# Patient Record
Sex: MALE | Race: WHITE | NOT HISPANIC OR LATINO | Employment: OTHER | URBAN - METROPOLITAN AREA
[De-identification: names, ages, dates, MRNs, and addresses within clinical notes are randomized per-mention and may not be internally consistent; named-entity substitution may affect disease eponyms.]

---

## 2024-05-24 ENCOUNTER — HOSPITAL ENCOUNTER (OUTPATIENT)
Facility: CLINIC | Age: 89
Setting detail: OBSERVATION
Discharge: HOME | End: 2024-05-25
Attending: EMERGENCY MEDICINE | Admitting: EMERGENCY MEDICINE
Payer: COMMERCIAL

## 2024-05-24 ENCOUNTER — APPOINTMENT (OUTPATIENT)
Dept: MRI IMAGING | Facility: CLINIC | Age: 89
End: 2024-05-24
Attending: EMERGENCY MEDICINE
Payer: COMMERCIAL

## 2024-05-24 DIAGNOSIS — R42 VERTIGO: ICD-10-CM

## 2024-05-24 DIAGNOSIS — N18.9 ACUTE KIDNEY INJURY SUPERIMPOSED ON CKD (H): ICD-10-CM

## 2024-05-24 DIAGNOSIS — N17.9 ACUTE KIDNEY INJURY SUPERIMPOSED ON CKD (H): ICD-10-CM

## 2024-05-24 PROBLEM — N18.4 TYPE 2 DIABETES MELLITUS WITH STAGE 4 CHRONIC KIDNEY DISEASE, WITHOUT LONG-TERM CURRENT USE OF INSULIN (H): Status: ACTIVE | Noted: 2023-12-03

## 2024-05-24 PROBLEM — N40.1 BENIGN PROSTATIC HYPERPLASIA WITH URINARY OBSTRUCTION: Status: ACTIVE | Noted: 2024-05-24

## 2024-05-24 PROBLEM — E11.22 TYPE 2 DIABETES MELLITUS WITH STAGE 4 CHRONIC KIDNEY DISEASE, WITHOUT LONG-TERM CURRENT USE OF INSULIN (H): Status: ACTIVE | Noted: 2023-12-03

## 2024-05-24 PROBLEM — K21.9 GERD (GASTROESOPHAGEAL REFLUX DISEASE): Status: ACTIVE | Noted: 2019-01-03

## 2024-05-24 PROBLEM — N18.4 STAGE 4 CHRONIC KIDNEY DISEASE (H): Status: ACTIVE | Noted: 2019-04-03

## 2024-05-24 PROBLEM — N13.8 BENIGN PROSTATIC HYPERPLASIA WITH URINARY OBSTRUCTION: Status: ACTIVE | Noted: 2024-05-24

## 2024-05-24 PROBLEM — I48.0 PAROXYSMAL ATRIAL FIBRILLATION (H): Status: ACTIVE | Noted: 2022-12-26

## 2024-05-24 PROBLEM — N25.81 HYPERPARATHYROIDISM, SECONDARY RENAL (H): Status: ACTIVE | Noted: 2022-04-06

## 2024-05-24 PROBLEM — E78.5 DYSLIPIDEMIA: Status: ACTIVE | Noted: 2024-05-24

## 2024-05-24 PROBLEM — E03.9 ACQUIRED HYPOTHYROIDISM: Status: ACTIVE | Noted: 2019-01-03

## 2024-05-24 PROBLEM — D63.1 ANEMIA OF CHRONIC KIDNEY FAILURE: Status: ACTIVE | Noted: 2019-01-03

## 2024-05-24 PROBLEM — I77.82 ANCA-ASSOCIATED VASCULITIS (H): Status: ACTIVE | Noted: 2022-04-06

## 2024-05-24 PROBLEM — I25.10 CORONARY ARTERY DISEASE INVOLVING NATIVE CORONARY ARTERY OF NATIVE HEART: Status: ACTIVE | Noted: 2019-01-03

## 2024-05-24 PROBLEM — I49.5 TACHY-BRADY SYNDROME (H): Status: ACTIVE | Noted: 2023-02-28

## 2024-05-24 PROBLEM — D63.8 ANEMIA OF CHRONIC DISEASE: Status: ACTIVE | Noted: 2020-12-02

## 2024-05-24 LAB
ALBUMIN SERPL BCG-MCNC: 4 G/DL (ref 3.5–5.2)
ALP SERPL-CCNC: 98 U/L (ref 40–150)
ALT SERPL W P-5'-P-CCNC: 11 U/L (ref 0–70)
ANION GAP SERPL CALCULATED.3IONS-SCNC: 10 MMOL/L (ref 7–15)
ANION GAP SERPL CALCULATED.3IONS-SCNC: 11 MMOL/L (ref 7–15)
AST SERPL W P-5'-P-CCNC: 21 U/L (ref 0–45)
BASOPHILS # BLD AUTO: 0 10E3/UL (ref 0–0.2)
BASOPHILS NFR BLD AUTO: 0 %
BILIRUB SERPL-MCNC: 0.3 MG/DL
BUN SERPL-MCNC: 54.6 MG/DL (ref 8–23)
BUN SERPL-MCNC: 59.4 MG/DL (ref 8–23)
CALCIUM SERPL-MCNC: 8.9 MG/DL (ref 8.8–10.2)
CALCIUM SERPL-MCNC: 9.4 MG/DL (ref 8.8–10.2)
CHLORIDE SERPL-SCNC: 109 MMOL/L (ref 98–107)
CHLORIDE SERPL-SCNC: 109 MMOL/L (ref 98–107)
CREAT SERPL-MCNC: 2.87 MG/DL (ref 0.67–1.17)
CREAT SERPL-MCNC: 3.2 MG/DL (ref 0.67–1.17)
DEPRECATED HCO3 PLAS-SCNC: 21 MMOL/L (ref 22–29)
DEPRECATED HCO3 PLAS-SCNC: 21 MMOL/L (ref 22–29)
EGFRCR SERPLBLD CKD-EPI 2021: 18 ML/MIN/1.73M2
EGFRCR SERPLBLD CKD-EPI 2021: 20 ML/MIN/1.73M2
EOSINOPHIL # BLD AUTO: 0.1 10E3/UL (ref 0–0.7)
EOSINOPHIL NFR BLD AUTO: 1 %
ERYTHROCYTE [DISTWIDTH] IN BLOOD BY AUTOMATED COUNT: 15 % (ref 10–15)
GLUCOSE SERPL-MCNC: 115 MG/DL (ref 70–99)
GLUCOSE SERPL-MCNC: 140 MG/DL (ref 70–99)
HCT VFR BLD AUTO: 36.8 % (ref 40–53)
HGB BLD-MCNC: 11.4 G/DL (ref 13.3–17.7)
HOLD SPECIMEN: NORMAL
HOLD SPECIMEN: NORMAL
IMM GRANULOCYTES # BLD: 0 10E3/UL
IMM GRANULOCYTES NFR BLD: 0 %
LYMPHOCYTES # BLD AUTO: 1.2 10E3/UL (ref 0.8–5.3)
LYMPHOCYTES NFR BLD AUTO: 15 %
MCH RBC QN AUTO: 31.8 PG (ref 26.5–33)
MCHC RBC AUTO-ENTMCNC: 31 G/DL (ref 31.5–36.5)
MCV RBC AUTO: 103 FL (ref 78–100)
MONOCYTES # BLD AUTO: 0.5 10E3/UL (ref 0–1.3)
MONOCYTES NFR BLD AUTO: 6 %
NEUTROPHILS # BLD AUTO: 6.3 10E3/UL (ref 1.6–8.3)
NEUTROPHILS NFR BLD AUTO: 78 %
NRBC # BLD AUTO: 0 10E3/UL
NRBC BLD AUTO-RTO: 0 /100
PLATELET # BLD AUTO: 197 10E3/UL (ref 150–450)
POTASSIUM SERPL-SCNC: 5.4 MMOL/L (ref 3.4–5.3)
POTASSIUM SERPL-SCNC: 5.8 MMOL/L (ref 3.4–5.3)
PROT SERPL-MCNC: 7.4 G/DL (ref 6.4–8.3)
RBC # BLD AUTO: 3.58 10E6/UL (ref 4.4–5.9)
SODIUM SERPL-SCNC: 140 MMOL/L (ref 135–145)
SODIUM SERPL-SCNC: 141 MMOL/L (ref 135–145)
WBC # BLD AUTO: 8.2 10E3/UL (ref 4–11)

## 2024-05-24 PROCEDURE — 82310 ASSAY OF CALCIUM: CPT | Performed by: EMERGENCY MEDICINE

## 2024-05-24 PROCEDURE — 250N000011 HC RX IP 250 OP 636: Performed by: PHYSICIAN ASSISTANT

## 2024-05-24 PROCEDURE — 80053 COMPREHEN METABOLIC PANEL: CPT | Performed by: EMERGENCY MEDICINE

## 2024-05-24 PROCEDURE — 99285 EMERGENCY DEPT VISIT HI MDM: CPT | Mod: 25

## 2024-05-24 PROCEDURE — G0378 HOSPITAL OBSERVATION PER HR: HCPCS

## 2024-05-24 PROCEDURE — 258N000003 HC RX IP 258 OP 636: Performed by: EMERGENCY MEDICINE

## 2024-05-24 PROCEDURE — 36415 COLL VENOUS BLD VENIPUNCTURE: CPT | Performed by: EMERGENCY MEDICINE

## 2024-05-24 PROCEDURE — 99222 1ST HOSP IP/OBS MODERATE 55: CPT | Performed by: PHYSICIAN ASSISTANT

## 2024-05-24 PROCEDURE — 96361 HYDRATE IV INFUSION ADD-ON: CPT

## 2024-05-24 PROCEDURE — 70544 MR ANGIOGRAPHY HEAD W/O DYE: CPT | Mod: XU

## 2024-05-24 PROCEDURE — 93005 ELECTROCARDIOGRAM TRACING: CPT

## 2024-05-24 PROCEDURE — 250N000013 HC RX MED GY IP 250 OP 250 PS 637: Performed by: EMERGENCY MEDICINE

## 2024-05-24 PROCEDURE — 70547 MR ANGIOGRAPHY NECK W/O DYE: CPT

## 2024-05-24 PROCEDURE — 85025 COMPLETE CBC W/AUTO DIFF WBC: CPT | Performed by: EMERGENCY MEDICINE

## 2024-05-24 PROCEDURE — 70551 MRI BRAIN STEM W/O DYE: CPT

## 2024-05-24 PROCEDURE — 250N000009 HC RX 250: Performed by: PHYSICIAN ASSISTANT

## 2024-05-24 PROCEDURE — 258N000003 HC RX IP 258 OP 636: Performed by: PHYSICIAN ASSISTANT

## 2024-05-24 PROCEDURE — 96374 THER/PROPH/DIAG INJ IV PUSH: CPT

## 2024-05-24 PROCEDURE — 250N000013 HC RX MED GY IP 250 OP 250 PS 637: Performed by: PHYSICIAN ASSISTANT

## 2024-05-24 RX ORDER — ISOSORBIDE MONONITRATE 30 MG/1
30 TABLET, EXTENDED RELEASE ORAL 2 TIMES DAILY
Status: ON HOLD | COMMUNITY
End: 2024-05-24

## 2024-05-24 RX ORDER — ALLOPURINOL 100 MG/1
200 TABLET ORAL DAILY
COMMUNITY

## 2024-05-24 RX ORDER — HYDRALAZINE HYDROCHLORIDE 20 MG/ML
10 INJECTION INTRAMUSCULAR; INTRAVENOUS EVERY 6 HOURS PRN
Status: DISCONTINUED | OUTPATIENT
Start: 2024-05-24 | End: 2024-05-25 | Stop reason: HOSPADM

## 2024-05-24 RX ORDER — MAGNESIUM OXIDE 400 MG/1
800 TABLET ORAL 2 TIMES DAILY
COMMUNITY

## 2024-05-24 RX ORDER — ACETAMINOPHEN 650 MG/1
650 SUPPOSITORY RECTAL EVERY 4 HOURS PRN
Status: DISCONTINUED | OUTPATIENT
Start: 2024-05-24 | End: 2024-05-25 | Stop reason: HOSPADM

## 2024-05-24 RX ORDER — ISOSORBIDE MONONITRATE 30 MG/1
30 TABLET, EXTENDED RELEASE ORAL DAILY
COMMUNITY

## 2024-05-24 RX ORDER — AMOXICILLIN 250 MG
2 CAPSULE ORAL 2 TIMES DAILY PRN
Status: DISCONTINUED | OUTPATIENT
Start: 2024-05-24 | End: 2024-05-25 | Stop reason: HOSPADM

## 2024-05-24 RX ORDER — VITAMIN B COMPLEX
25 TABLET ORAL DAILY
COMMUNITY

## 2024-05-24 RX ORDER — LOSARTAN POTASSIUM 50 MG/1
50 TABLET ORAL DAILY
Status: DISCONTINUED | OUTPATIENT
Start: 2024-05-24 | End: 2024-05-24

## 2024-05-24 RX ORDER — LANOLIN ALCOHOL/MO/W.PET/CERES
500 CREAM (GRAM) TOPICAL DAILY
COMMUNITY

## 2024-05-24 RX ORDER — ACETAMINOPHEN 325 MG/1
650 TABLET ORAL EVERY 4 HOURS PRN
Status: DISCONTINUED | OUTPATIENT
Start: 2024-05-24 | End: 2024-05-25 | Stop reason: HOSPADM

## 2024-05-24 RX ORDER — ATORVASTATIN CALCIUM 20 MG/1
20 TABLET, FILM COATED ORAL DAILY
COMMUNITY

## 2024-05-24 RX ORDER — ONDANSETRON 4 MG/1
4 TABLET, ORALLY DISINTEGRATING ORAL EVERY 6 HOURS PRN
Status: DISCONTINUED | OUTPATIENT
Start: 2024-05-24 | End: 2024-05-25 | Stop reason: HOSPADM

## 2024-05-24 RX ORDER — NIFEDIPINE 30 MG/1
30 TABLET, EXTENDED RELEASE ORAL DAILY
Status: DISCONTINUED | OUTPATIENT
Start: 2024-05-25 | End: 2024-05-24

## 2024-05-24 RX ORDER — NIFEDIPINE 30 MG/1
30 TABLET, EXTENDED RELEASE ORAL DAILY
Status: DISCONTINUED | OUTPATIENT
Start: 2024-05-24 | End: 2024-05-24

## 2024-05-24 RX ORDER — DONEPEZIL HYDROCHLORIDE 10 MG/1
10 TABLET, FILM COATED ORAL DAILY
COMMUNITY

## 2024-05-24 RX ORDER — ONDANSETRON 2 MG/ML
4 INJECTION INTRAMUSCULAR; INTRAVENOUS EVERY 6 HOURS PRN
Status: DISCONTINUED | OUTPATIENT
Start: 2024-05-24 | End: 2024-05-25 | Stop reason: HOSPADM

## 2024-05-24 RX ORDER — MECLIZINE HYDROCHLORIDE 25 MG/1
25 TABLET ORAL ONCE
Status: COMPLETED | OUTPATIENT
Start: 2024-05-24 | End: 2024-05-24

## 2024-05-24 RX ORDER — LEVOTHYROXINE SODIUM 25 UG/1
25 TABLET ORAL DAILY
COMMUNITY

## 2024-05-24 RX ORDER — HYDRALAZINE HYDROCHLORIDE 20 MG/ML
10 INJECTION INTRAMUSCULAR; INTRAVENOUS EVERY 6 HOURS PRN
Status: DISCONTINUED | OUTPATIENT
Start: 2024-05-24 | End: 2024-05-24

## 2024-05-24 RX ORDER — LOSARTAN POTASSIUM 50 MG/1
50 TABLET ORAL AT BEDTIME
COMMUNITY

## 2024-05-24 RX ORDER — METOPROLOL SUCCINATE 50 MG/1
50 TABLET, EXTENDED RELEASE ORAL 2 TIMES DAILY
Status: DISCONTINUED | OUTPATIENT
Start: 2024-05-24 | End: 2024-05-25 | Stop reason: HOSPADM

## 2024-05-24 RX ORDER — NIFEDIPINE 30 MG/1
30 TABLET, EXTENDED RELEASE ORAL DAILY
Status: DISCONTINUED | OUTPATIENT
Start: 2024-05-25 | End: 2024-05-25 | Stop reason: HOSPADM

## 2024-05-24 RX ORDER — METOPROLOL SUCCINATE 50 MG/1
50 TABLET, EXTENDED RELEASE ORAL 2 TIMES DAILY
COMMUNITY

## 2024-05-24 RX ORDER — AMOXICILLIN 250 MG
1 CAPSULE ORAL 2 TIMES DAILY PRN
Status: DISCONTINUED | OUTPATIENT
Start: 2024-05-24 | End: 2024-05-25 | Stop reason: HOSPADM

## 2024-05-24 RX ADMIN — APIXABAN 2.5 MG: 2.5 TABLET, FILM COATED ORAL at 23:54

## 2024-05-24 RX ADMIN — HYDRALAZINE HYDROCHLORIDE 10 MG: 20 INJECTION INTRAMUSCULAR; INTRAVENOUS at 20:50

## 2024-05-24 RX ADMIN — METOPROLOL SUCCINATE 50 MG: 50 TABLET, EXTENDED RELEASE ORAL at 23:54

## 2024-05-24 RX ADMIN — MECLIZINE HYDROCHLORIDE 25 MG: 25 TABLET ORAL at 16:06

## 2024-05-24 RX ADMIN — SODIUM ZIRCONIUM CYCLOSILICATE 10 G: 5 POWDER, FOR SUSPENSION ORAL at 22:37

## 2024-05-24 RX ADMIN — SODIUM BICARBONATE: 84 INJECTION, SOLUTION INTRAVENOUS at 23:53

## 2024-05-24 RX ADMIN — SODIUM CHLORIDE 1000 ML: 9 INJECTION, SOLUTION INTRAVENOUS at 13:45

## 2024-05-24 ASSESSMENT — ACTIVITIES OF DAILY LIVING (ADL)
ADLS_ACUITY_SCORE: 35

## 2024-05-24 ASSESSMENT — COLUMBIA-SUICIDE SEVERITY RATING SCALE - C-SSRS
6. HAVE YOU EVER DONE ANYTHING, STARTED TO DO ANYTHING, OR PREPARED TO DO ANYTHING TO END YOUR LIFE?: NO
1. IN THE PAST MONTH, HAVE YOU WISHED YOU WERE DEAD OR WISHED YOU COULD GO TO SLEEP AND NOT WAKE UP?: NO
2. HAVE YOU ACTUALLY HAD ANY THOUGHTS OF KILLING YOURSELF IN THE PAST MONTH?: NO

## 2024-05-24 NOTE — H&P
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     Dizziness, likely multifactorial in the setting of accelerated hypertension and hypovolemia   Patient presents from adult  due to acute onset dizziness while playing dominos. Describes as feeling light headed and off balance with subsequent episode of vomiting. BP at the time was 200 systolic per report. Denies associated cardiac symptoms   *MRI/MRA in the ED shows chronic small vessel ischemic disease with unremarkable angio  *EKG with sinus bradycardia in 50s, incomplete RBBB (known)  *workup also notable for ALYSSIA  *s/p IVF bolus and meclizine in the ED   Feeling significantly improved at time of admission   --admit to observation  --telemetry, daughter reports baseline HR 40-50s  --check orthostatics   --management of BP as below   --s/p 1L bolus, continue maintenance fluid overnight   --PT  --ECHO    Accelerated hypertension  BP has been generally around 140-150 at home per daughter.   BP taken by nursing at adult  reportedly in 200s.   BP on arrival to ED elevated 180-190s  PTA he follows with Nephrology, awaiting med rec but daughter reports he takes metoprolol, Imdur, and losartan. 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   Baseline Cr 2-2.9 recently  Cr on admission 3.2,  improved to 2.87 after IVF   K on admission mildly elevated 5.4>5.8 after IVF  Daughter reports water intake is minimal at home despite encouragement  --lokelma x2  --start sodium bicarb x12 hours  --recheck K @ midnight, please page provider if abnormal   --avoid nephrotoxins   --renal diet   --hold losartan     Paroxysmal atrial fibrillation   Sinus bradycardia   PTA on metoprolol and Eliquis per daughter, awaiting formal med rec   In sinus rhythm on admission  Per most recent Cardiology notes baseline HR 40-50s and he has been asymptomatic with this so BB continued at present dose.   --telemetry  --resume metoprolol and Eliquis when verified     Cognitive impairment  Resume Aricept when verified.     HLD  Resume statin at discharge     Hx gout  Resume allopurinol when verified     Hypothyroidism  Resume PTA levothyroxine when verified         Diet: Regular Diet Adult    DVT Prophylaxis: DOAC  Urrutia Catheter: Not present  Lines: None     Cardiac Monitoring: None  Code Status:  FULL CODE, discussed on admission with pt and daughter     Clinically Significant Risk Factors Present on Admission        # Hyperkalemia: Highest K = 5.4 mmol/L in last 2 days, will monitor as appropriate         # Drug Induced Platelet Defect: home medication list includes an antiplatelet medication                   Disposition Plan     Medically Ready for Discharge: 1-2 days        The patient's care was discussed with Dr. Siddiqui who agrees with the above plan     Fay Mccloud PA-C  Hospitalist Service  [facility]  Securely message with [employer] (more info)  Text page via [employer]/Directory     ______________________________________________________________________    Chief Complaint   dizziness    History is obtained from the patient and his daughter who serves as      History of Present Illness   Jack Ryan is a 88 year old male with a past medical history significant for CKD in the  setting of ANCA vasculitis, HTN, BPH, paroxysmal atrial fibrillation, HLD, hypothyroidism, cognitive impairment, gout admitted on 5/24/2024 to observation after presenting with dizziness.   Patient's daughter is present at bedside and serving as .  She reports that the patient was in his usual state of health today and blood pressure was in the 140s when he took it this morning as he always does.  He was at adult  playing [employer] when he reported feeling dizzy to staff there.  He describes this as a gradual onset spinning sensation and says he felt unsteady like he needed help when walking around.  The dizziness caused him to feel nauseated and eventually vomit x 1.  He denied any associated chest pain palpitations or shortness of breath.  On arrival of EMS he was found to have a blood pressure of 199/74.  On arrival to the ED he reported a headache.  He was evaluated with MRI/MRA and labs showing ALYSSIA and hyperkalemia.  He reported feeling better shortly after arrival to the ED and tells me this occurred after receiving IV fluids.  His daughter reports fluid intake has been challenging for him and he drinks little water.  The patient reports he feels well at present.  Denies any chest or abdominal pain.  No shortness of breath.  Denies dizziness currently.  Reports he ambulated to the bathroom in the ED and felt back to baseline.  He took all of his normal medications this morning.    Past Medical History    Past Medical History:   Diagnosis Date    Glaucoma associated with anterior segment anomaly     Hypertension     Hypertrophy of prostate without urinary obstruction and other lower urinary tract symptoms (LUTS)     Other and unspecified hyperlipidemia     Type 2 diabetes mellitus with stage 4 chronic kidney disease, without long-term current use of insulin (H) 12/3/2023       Past Surgical History   Past Surgical History:   Procedure Laterality Date    GYN SURGERY      INCISION LIMBAL  RELAXING, KERATOTOMY ARCUATE Left 2014    Procedure: INCISION LIMBAL RELAXING, KERATOTOMY ARCUATE;  Surgeon: Josselyn Morales MD;  Location: [facility]    PHACOEMULSIFICATION CLEAR CORNEA W/ STANDARD IOL IMPLANT, ENDOSCOPIC CYCLOPHOTOCOAGULATION, COMBINED  6/10/2014    Procedure: COMBINED PHACOEMULSIFICATION CLEAR CORNEA WITH STANDARD INTRAOCULAR LENS IMPLANT, ENDOSCOPIC CYCLOPHOTOCOAGULATION;  Surgeon: Josselyn Morales MD;  Location:  [facility]    PHACOEMULSIFICATION CLEAR CORNEA W/ STANDARD IOL IMPLANT, ENDOSCOPIC CYCLOPHOTOCOAGULATION, COMBINED Left 2014    Procedure: COMBINED PHACOEMULSIFICATION CLEAR CORNEA WITH STANDARD INTRAOCULAR LENS IMPLANT, ENDOSCOPIC CYCLOPHOTOCOAGULATION;  Surgeon: Josselyn Morales MD;  Location: [facility]       Prior to Admission Medications   Prior to Admission Medications   Prescriptions Last Dose Informant Patient Reported? Taking?   AMLODIPINE BESYLATE PO   Yes No   Sig: Take 2.5 mg by mouth daily   ATORVASTATIN CALCIUM PO   Yes No   Sig: Take 20 mg by mouth daily   DOCUSATE SODIUM PO   Yes No   Sig: Take 100 mg by mouth 2 times daily   IBUPROFEN PO   Yes No   Sig: Take by mouth every 6 hours as needed for moderate pain   Multiple Vitamins-Minerals (MULTIVITAMIN OR)   Yes No   Sig: Take 1 tablet by mouth daily   Omega-3 Fatty Acids (OMEGA-3 FISH OIL PO)   Yes No   acetaZOLAMIDE (DIAMOX SEQUELS) 500 MG capsule   No No   Sig: Take 1 capsule (500 mg) by mouth every 12 hours   aspirin 81 MG tablet   Yes No   Sig: Take 81 mg by mouth daily   bimatoprost (LUMIGAN) 0.03 % ophthalmic drops   Yes No   Si drop At Bedtime   dorzolamide-timolol (COSOPT) 2-0.5 % ophthalmic solution   Yes No   Si drop 2 times daily   fluticasone (FLONASE) 50 MCG/ACT nasal spray   Yes No   Sig: Spray 2 sprays into both nostrils daily   sodium chloride (OCEAN) 0.65 % nasal spray   Yes No   Sig: Spray 1 spray into both nostrils daily as needed for congestion      Facility-Administered Medications: None         Social History   I have reviewed this patient's social history and updated it with pertinent information if needed.  Social History     Tobacco Use    Smoking status: Never   Substance Use Topics    Alcohol use: No    Drug use: No        Physical Exam   Temp: 98  F (36.7  C) Temp src: Oral BP: (!) 149/64 Pulse: 59   Resp: 18 SpO2: 96 % O2 Device: None (Room air)    There were no vitals filed for this visit.  Vital Signs with Ranges  Temp:  [98  F (36.7  C)-98.3  F (36.8  C)] 98  F (36.7  C)  Pulse:  [50-60] 59  Resp:  [15-22] 18  BP: (149-196)/(64-80) 149/64  SpO2:  [95 %-97 %] 96 %  No intake/output data recorded.    Constitutional: Alert, sitting up in bed. Appears comfortable. Appropriately conversant via daughter who is interpreting   ENT:  moist mucous membranes  Respiratory: Lungs clear to auscultation bilaterally, no increased work of breathing  Cardiovascular: Regular rhythm with mild bradycardia. +systolic murmur.  No LE edema   GI: active bowel sounds, abdomen soft, non-tender  MSK:  moves all four extremities. Normal tone  Neuro:  speech is clear. Face symmetric. Follows commands. No focal deficits        Medical Decision Making       70 MINUTES SPENT BY ME on the date of service doing chart review, history, exam, documentation & further activities per the note.      Data     I have personally reviewed the following data over the past 24 hrs:    8.2  \   11.4 (L)   / 197     140 109 (H) 54.6 (H) /  140 (H)   5.8 (H) 21 (L) 2.87 (H) \     ALT: 11 AST: 21 AP: 98 TBILI: 0.3   ALB: 4.0 TOT PROTEIN: 7.4 LIPASE: N/A       Imaging results reviewed over the past 24 hrs:   Recent Results (from the past 24 hour(s))   MRA Angiogram Head w/o Contrast    Narrative    MR ANGIOGRAM OF THE HEAD WITHOUT CONTRAST   5/24/2024 3:30 PM     COMPARISON: None    HISTORY: Dizziness, vertigo, headache.    TECHNIQUE:  3D time-of-flight MR angiogram of the head without  contrast. MIP reconstruction of all MR angiographic data  was  performed.    FINDINGS:  The bilateral distal internal carotid, basilar, bilateral  anterior cerebral, bilateral middle cerebral and bilateral posterior  cerebral arteries are patent and unremarkable with no evidence for  cerebral artery stenosis or aneurysm.        Impression    IMPRESSION:  Normal MR angiogram of the head.      KAYLAN BARRON MD         SYSTEM ID:  [id number]   MRA Angiogram Neck w/o Contrast    Narrative    MRA NECK WITHOUT CONTRAST  5/24/2024 3:30 PM     COMPARISON: None    HISTORY: Dizziness, vertigo, headache.    TECHNIQUE: 2D time-of-flight MR angiogram of the neck without  contrast. MIP reconstruction of all MR angiographic data was  performed. Estimates of carotid stenoses are made relative to the  distal internal carotid artery diameters except as noted.    FINDINGS:    Carotids: The common carotid arteries bilaterally are widely patent.  The cervical internal carotid arteries bilaterally are patent without  stenosis.    Vertebrals: The vertebral arteries bilaterally are patent without  stenosis and demonstrate antegrade flow.    Aortic arch: The arteries as they arise from the aortic arch are  normally arranged with no evidence for stenosis.      Impression    IMPRESSION: Normal noncontrast MR angiogram of the neck.    KAYLAN BARRON MD         SYSTEM ID:  [id number]   MR Brain w/o Contrast    Narrative    MRI OF THE BRAIN WITHOUT CONTRAST 5/24/2024 3:30 PM     COMPARISON: None.    HISTORY: Dizziness, vertigo, headache.     TECHNIQUE:   Brain: Axial diffusion-weighted with ADC map, T2-weighted with fat  saturation, T1-weighted and turboFLAIR and sagittal T1-weighted images  of the brain were obtained without intravenous contrast.     FINDINGS: There is mild diffuse cerebral volume loss. There are  minimal patchy periventricular and a few tiny scattered focal areas of  abnormal T2 signal hyperintensity in the cerebral white matter  bilaterally that are consistent with sequela of chronic  small vessel  ischemic disease.    The ventricles and basal cisterns are within normal limits in  configuration given the degree of cerebral volume loss. There is no  midline shift. There are no extra-axial fluid collections. There is no  evidence for stroke or acute intracranial hemorrhage.    There is no sinusitis or mastoiditis.      Impression    IMPRESSION: Diffuse cerebral volume loss and cerebral white matter  changes consistent with chronic small vessel ischemic disease. No  evidence for acute intracranial pathology.    KAYLAN BARRON MD         SYSTEM ID:  [id number]

## 2024-05-24 NOTE — ED TRIAGE NOTES
Patient presents via EMS. Per report, patient comes from an adult  center where staff called EMS for dizziness. No fall or LOC. Had one episode of vomiting. Hypertensive for /74.       [place]  via Iphone used for all assessments      Triage Assessment (Adult)       Row Name 05/24/24 1223          Triage Assessment    Airway WDL WDL        Respiratory WDL    Respiratory WDL WDL        Skin Circulation/Temperature WDL    Skin Circulation/Temperature WDL WDL        Cardiac WDL    Cardiac WDL WDL        Peripheral/Neurovascular WDL    Peripheral Neurovascular WDL WDL        Cognitive/Neuro/Behavioral WDL    Cognitive/Neuro/Behavioral WDL WDL

## 2024-05-24 NOTE — ED NOTES
"[facility]  ED Nurse Handoff Report    ED Chief complaint: Dizziness and Hypertension      ED Diagnosis:   Final diagnoses:   Acute kidney injury superimposed on CKD  (H24)   Vertigo       Code Status: Unknown    Allergies:   Allergies   Allergen Reactions    Procaine Unknown       Patient Story: Patient presents via EMS. Per report, patient comes from an adult  center where staff called EMS for dizziness. No fall or LOC. Had one episode of vomiting. Hypertensive for /74.     Focused Assessment:  88 year old male, anticoagulated (Eliquis), who presents to the ED for evaluation of dizziness and hypertension. Earlier today, staff from the patient's adult day care called EMS due to the patient feeling dizzy. Staff also report the patient has had recent decreased oral intake and one episode of emesis. Staff denies that the patient has not recently fallen or lost consciousness recently. The patient reports feeling normal this morning and notes he has taken his medication this morning. Later in the morning he states he began to feel dizzy and reports that he was hypertensive after taking his blood pressure. He reports he is still feeling dizzy and that his vision has been \"jumping around\" while reading. He notes his dizziness feels like his head is spinning. He reports feeling shaky upon ambulation. He notes a headache that is localized to his temples bilaterally. He denies any difficulty breathing, shortness of breath, chest pain, abdominal pain, or recent fevers or illness. He denies any new difficulty hearing, tinnitus, or ear pain.     Treatments and/or interventions provided:   MRA Angiogram Head w/o Contrast   Final Result   IMPRESSION:  Normal MR angiogram of the head.        KAYLAN BARRON MD            SYSTEM ID:  [id number]      MRA Angiogram Neck w/o Contrast   Final Result   IMPRESSION: Normal noncontrast MR angiogram of the neck.      KAYLAN BARRON MD            SYSTEM ID:  " [id number]      MR Brain w/o Contrast   Final Result   IMPRESSION: Diffuse cerebral volume loss and cerebral white matter   changes consistent with chronic small vessel ischemic disease. No   evidence for acute intracranial pathology.      KAYLAN BARRON MD            SYSTEM ID:  [id number]         Labs Ordered and Resulted from Time of ED Arrival to Time of ED Departure   COMPREHENSIVE METABOLIC PANEL - Abnormal       Result Value    Sodium 141      Potassium 5.4 (*)     Carbon Dioxide (CO2) 21 (*)     Anion Gap 11      Urea Nitrogen 59.4 (*)     Creatinine 3.20 (*)     GFR Estimate 18 (*)     Calcium 9.4      Chloride 109 (*)     Glucose 115 (*)     Alkaline Phosphatase 98      AST 21      ALT 11      Protein Total 7.4      Albumin 4.0      Bilirubin Total 0.3     CBC WITH PLATELETS AND DIFFERENTIAL - Abnormal    WBC Count 8.2      RBC Count 3.58 (*)     Hemoglobin 11.4 (*)     Hematocrit 36.8 (*)      (*)     MCH 31.8      MCHC 31.0 (*)     RDW 15.0      Platelet Count 197      % Neutrophils 78      % Lymphocytes 15      % Monocytes 6      % Eosinophils 1      % Basophils 0      % Immature Granulocytes 0      NRBCs per 100 WBC 0      Absolute Neutrophils 6.3      Absolute Lymphocytes 1.2      Absolute Monocytes 0.5      Absolute Eosinophils 0.1      Absolute Basophils 0.0      Absolute Immature Granulocytes 0.0      Absolute NRBCs 0.0     BASIC METABOLIC PANEL      Patient's response to treatments and/or interventions: Care explained    Does this patient have any cognitive concerns?:  None    Activity level - Baseline/Home:  Stand with Assist  Activity Level - Current:   Stand with Assist    Patient's Preferred language: Russian   Needed?: Yes    Isolation: None  Infection: Not Applicable  Patient tested for COVID 19 prior to admission: NO  Bariatric?: No    Vital Signs:   Vitals:    05/24/24 1222 05/24/24 1223 05/24/24 1226 05/24/24 1345   BP:   (!) 186/80 (!) 179/65   Pulse: 60   56   Resp: 22    19   Temp:  98.3  F (36.8  C)     TempSrc:  Oral     SpO2: 95%   97%       Was the PSS-3 completed:   Yes   Family Comments: Family updated via phone  OBS brochure/video discussed/provided to patient/family: No    For the majority of the shift this patient's behavior was Green.   Behavioral interventions performed were Care explained.    ED NURSE PHONE NUMBER: [phone]

## 2024-05-24 NOTE — ED NOTES
Bed: ED30  Expected date:   Expected time:   Means of arrival:   Comments:  415 88M weak/dizzy/HTN

## 2024-05-24 NOTE — ED PROVIDER NOTES
"  History     Chief Complaint:  Dizziness and Hypertension       A  was used (Malaysian).      Jack Ryan is a 88 year old male, anticoagulated (Eliquis), who presents to the ED for evaluation of dizziness and hypertension. Earlier today, staff from the patient's adult day care called EMS due to the patient feeling dizzy. Staff also report the patient has had recent decreased oral intake and one episode of emesis. Staff denies that the patient has not recently fallen or lost consciousness recently. The patient reports feeling normal this morning and notes he has taken his medication this morning. Later in the morning he states he began to feel dizzy and reports that he was hypertensive after taking his blood pressure. He reports he is still feeling dizzy and that his vision has been \"jumping around\" while reading. He notes his dizziness feels like his head is spinning. He reports feeling shaky upon ambulation. He notes a headache that is localized to his temples bilaterally. He denies any difficulty breathing, shortness of breath, chest pain, abdominal pain, or recent fevers or illness. He denies any new difficulty hearing, tinnitus, or ear pain. No numbness, tingling, or weakness. He denies any other symptoms.      Independent Historian:   None    Review of External Notes:  None    Medications:    Acetazolamide   Amlodipine besylate PO  Atorvastatin  Lumigan  Docusate sodium PO  Cosopt  Flonase  Imdur  Eliquis   Lipitor   Synthroid  Cozaar   Toprol XL  Prilosec   Zyloprim   Aricept       Past Medical History:    Glaucoma   Hypertension   Hypertrophy of prostate without urinary obstruction and other lower urinary tract symptoms   Diabetes mellitus with stage 4 chronic kidney disease without long term use of insulin  Hyperlipidemia   Colon polyps   Hypertrophy of prostate without urinary obstruction and other lower urinary tract symptoms   Calculus of kidney   Crescentic glomerulonephritis "   CKD   Leukocytosis   Renal failure  Pleural effusion on left  RPGN  Spigelian hernia   Anemia of chronic disease   Hypothyroidism  Dysphagia  CAD  TB  Tachy-ivelisse syndrome  Paroxysmal atrial fibrillation    Past Surgical History:    GYN surgery   Incision limbal relaxing, keratotomy arcuate   Phacoemulsification clear cornea with standard IOL implant, endoscopic cyclophotocoagulation combined   NV cysto with bladder neck incision  NV non contact laser surgery  Hernia repair   Colonoscopy  EGD combined     Physical Exam   Patient Vitals for the past 24 hrs:   BP Temp Temp src Pulse Resp SpO2   05/24/24 2107 (!) 167/65 -- -- 57 15 --   05/24/24 2046 (!) 196/78 -- -- 58 21 95 %   05/24/24 1947 (!) 193/79 -- -- 50 15 96 %   05/24/24 1345 (!) 179/65 -- -- 56 19 97 %   05/24/24 1226 (!) 186/80 -- -- -- -- --   05/24/24 1223 -- 98.3  F (36.8  C) Oral -- -- --   05/24/24 1222 -- -- -- 60 22 95 %        Physical Exam  General: Well appearing, nontoxic.  Resting comfortably  Head:  Scalp, face, and head appear normal  Eyes:  Pupils are equal, round, reactive to light EOMI. + rightward beating nystagmus greatest on rightward gaze.     Conjunctivae non-injected and sclerae white  ENT:    The external nose is normal    Pinnae are normal  Neck:  Normal range of motion    There is no rigidity noted    Trachea is in the midline  CV:  Regular rate and rhythm     Normal S1/S2, no S3/S4    2/6 systolic murmur. No rub. Radial pulses 2+ bilaterally.  Resp:  Lungs are clear and equal bilaterally  There is no tachypnea    No increased work of breathing    No rales, wheezing, or rhonchi  GI:  Abdomen is soft, no rigidity or guarding    No distension, or mass    No tenderness or rebound tenderness   MS:  Normal muscular tone    Symmetric motor strength    No lower extremity edema  Skin:  No rash or acute skin lesions noted  Neuro:  A&Ox3, GCS 15    CN II - XII intact    Speech clear, fluent, and normal    Strength 5/5 and symmetric in  bilateral upper and lower extremities.    No pronator drift. No leg drift. SILT throughout.    No ankle clonus    FTN testing normal. No tremor.     No meningismus   Psych: Normal affect. Appropriate interactions.      Emergency Department Course   No results found for this or any previous visit.  ECG:  ECG taken at 1228, ECG read at 1235  Sinus bradycardia  Left axis deviation  Incomplete right bundle branch block  Minimal voltage criteria for LVH, may be normal variant (R in aVL)  Septal infarct, age undetermined  Abnormal ECG   Rate 58 bpm. AR interval 182 ms. QRS duration 94 ms. QT/QTc 414/406 ms. P-R-T axes 57 -40 68.             Imaging:  MRA Angiogram Head w/o Contrast   Final Result   IMPRESSION:  Normal MR angiogram of the head.        KAYLAN BARRON MD            SYSTEM ID:  [id number]      MRA Angiogram Neck w/o Contrast   Final Result   IMPRESSION: Normal noncontrast MR angiogram of the neck.      KAYLAN BARRON MD            SYSTEM ID:  [id number]      MR Brain w/o Contrast   Final Result   IMPRESSION: Diffuse cerebral volume loss and cerebral white matter   changes consistent with chronic small vessel ischemic disease. No   evidence for acute intracranial pathology.      KAYLAN BARRON MD            SYSTEM ID:  [id number]           Laboratory:  Labs Ordered and Resulted from Time of ED Arrival to Time of ED Departure   COMPREHENSIVE METABOLIC PANEL - Abnormal       Result Value    Sodium 141      Potassium 5.4 (*)     Carbon Dioxide (CO2) 21 (*)     Anion Gap 11      Urea Nitrogen 59.4 (*)     Creatinine 3.20 (*)     GFR Estimate 18 (*)     Calcium 9.4      Chloride 109 (*)     Glucose 115 (*)     Alkaline Phosphatase 98      AST 21      ALT 11      Protein Total 7.4      Albumin 4.0      Bilirubin Total 0.3     CBC WITH PLATELETS AND DIFFERENTIAL - Abnormal    WBC Count 8.2      RBC Count 3.58 (*)     Hemoglobin 11.4 (*)     Hematocrit 36.8 (*)      (*)     MCH 31.8      MCHC 31.0 (*)     RDW 15.0       Platelet Count 197      % Neutrophils 78      % Lymphocytes 15      % Monocytes 6      % Eosinophils 1      % Basophils 0      % Immature Granulocytes 0      NRBCs per 100 WBC 0      Absolute Neutrophils 6.3      Absolute Lymphocytes 1.2      Absolute Monocytes 0.5      Absolute Eosinophils 0.1      Absolute Basophils 0.0      Absolute Immature Granulocytes 0.0      Absolute NRBCs 0.0     BASIC METABOLIC PANEL - Abnormal    Sodium 140      Potassium 5.8 (*)     Chloride 109 (*)     Carbon Dioxide (CO2) 21 (*)     Anion Gap 10      Urea Nitrogen 54.6 (*)     Creatinine 2.87 (*)     GFR Estimate 20 (*)     Calcium 8.9      Glucose 140 (*)         Procedures     Emergency Department Course & Assessments:             Interventions:  Medications   sodium bicarbonate 150 mEq in D5W 1,000 mL infusion (has no administration in time range)   sodium zirconium cyclosilicate (LOKELMA) packet 10 g (has no administration in time range)     Followed by   sodium zirconium cyclosilicate (LOKELMA) packet 10 g (has no administration in time range)   hydrALAZINE (APRESOLINE) injection 10 mg (10 mg Intravenous $Given 5/24/24 2050)   NIFEdipine ER OSMOTIC (PROCARDIA XL) 24 hr tablet 30 mg (has no administration in time range)   sodium chloride 0.9% BOLUS 1,000 mL (0 mLs Intravenous Stopped 5/24/24 1556)   meclizine (ANTIVERT) tablet 25 mg (25 mg Oral $Given 5/24/24 1606)        Assessments, Independent Interpretation, Consults/Discussion of Management/Tests:  ED Course as of 05/24/24 2122   Fri May 24, 2024   1239 I obtained history and examined the patient as noted above.    1411 I spoke to imaging regarding the patient.   1731 Case discussed with hospitalist MINISTERIO Mccloud who accepts the patient for admission       Social Determinants of Health affecting care:  None    Disposition:  The patient was admitted to the hospital under the care of hospitalist MINISTERIO Mccloud.     Impression & Plan      Medical Decision Making:  Jack Ryan  is a 88 year old male who presents to the ED for evaluation of dizziness and elevated blood pressure readings.  He describes the dizziness as a spinning vertigo-like sensation.  On my initial evaluation patient did have some unidirectional nystagmus.  No other neurologic deficits or findings.  A broad differential diagnosis is considered.  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  MRI and MRA of the head and neck are negative for stroke, demyelinating process, intracranial lesion, intracranial hemorrhage or any vascular pathology.  No vertebral artery dissection or high-grade vascular stenosis.  EKG shows normal sinus rhythm without evidence of ischemia or dysrhythmia.  Laboratory studies show several abnormalities including worsened renal function consistent with ALYSSIA on CKD.  Minimally elevated potassium at 5.4.  No EKG changes related to this.  LFTs are normal.  No leukocytosis fever or other evidence of infection.  Patient was treated with IV fluids and meclizine and felt improved however given his symptoms with significant increase in renal function and borderline hyperkalemia patient will be admitted to the hospital for ongoing monitoring evaluation and treatment.  The case was discussed with the hospitalist and the patient was admitted in stable condition.      Diagnosis:    ICD-10-CM    1. Acute kidney injury superimposed on CKD  (H24)  N17.9     N18.9       2. Vertigo  R42              Scribe Disclosure:  I, Souleymane Rosen, am serving as a scribe at 1:08 PM on 5/24/2024 to document services personally performed by Stan Medina MD based on my observations and the provider's statements to me.      5/24/2024   Stan Medina MD Daro, Ryan Clay, MD  05/24/24 2125

## 2024-05-25 ENCOUNTER — APPOINTMENT (OUTPATIENT)
Dept: PHYSICAL THERAPY | Facility: CLINIC | Age: 89
End: 2024-05-25
Attending: PHYSICIAN ASSISTANT
Payer: COMMERCIAL

## 2024-05-25 ENCOUNTER — APPOINTMENT (OUTPATIENT)
Dept: CARDIOLOGY | Facility: CLINIC | Age: 89
End: 2024-05-25
Attending: PHYSICIAN ASSISTANT
Payer: COMMERCIAL

## 2024-05-25 VITALS
TEMPERATURE: 98.6 F | SYSTOLIC BLOOD PRESSURE: 146 MMHG | BODY MASS INDEX: 24.9 KG/M2 | HEART RATE: 55 BPM | WEIGHT: 138.45 LBS | DIASTOLIC BLOOD PRESSURE: 71 MMHG | OXYGEN SATURATION: 95 % | RESPIRATION RATE: 18 BRPM

## 2024-05-25 LAB
ANION GAP SERPL CALCULATED.3IONS-SCNC: 9 MMOL/L (ref 7–15)
BUN SERPL-MCNC: 45.8 MG/DL (ref 8–23)
CALCIUM SERPL-MCNC: 8.6 MG/DL (ref 8.8–10.2)
CHLORIDE SERPL-SCNC: 107 MMOL/L (ref 98–107)
CREAT SERPL-MCNC: 2.79 MG/DL (ref 0.67–1.17)
DEPRECATED HCO3 PLAS-SCNC: 24 MMOL/L (ref 22–29)
EGFRCR SERPLBLD CKD-EPI 2021: 21 ML/MIN/1.73M2
ERYTHROCYTE [DISTWIDTH] IN BLOOD BY AUTOMATED COUNT: 14.7 % (ref 10–15)
GLUCOSE SERPL-MCNC: 104 MG/DL (ref 70–99)
HCT VFR BLD AUTO: 29.4 % (ref 40–53)
HEMOCCULT STL QL: NEGATIVE
HGB BLD-MCNC: 9.3 G/DL (ref 13.3–17.7)
HGB BLD-MCNC: 9.5 G/DL (ref 13.3–17.7)
LVEF ECHO: NORMAL
MCH RBC QN AUTO: 31.5 PG (ref 26.5–33)
MCHC RBC AUTO-ENTMCNC: 31.6 G/DL (ref 31.5–36.5)
MCV RBC AUTO: 100 FL (ref 78–100)
PLATELET # BLD AUTO: 174 10E3/UL (ref 150–450)
POTASSIUM SERPL-SCNC: 4.3 MMOL/L (ref 3.4–5.3)
POTASSIUM SERPL-SCNC: 4.7 MMOL/L (ref 3.4–5.3)
RBC # BLD AUTO: 2.95 10E6/UL (ref 4.4–5.9)
SODIUM SERPL-SCNC: 140 MMOL/L (ref 135–145)
WBC # BLD AUTO: 7 10E3/UL (ref 4–11)

## 2024-05-25 PROCEDURE — 80048 BASIC METABOLIC PNL TOTAL CA: CPT | Performed by: PHYSICIAN ASSISTANT

## 2024-05-25 PROCEDURE — 85018 HEMOGLOBIN: CPT | Performed by: INTERNAL MEDICINE

## 2024-05-25 PROCEDURE — 85041 AUTOMATED RBC COUNT: CPT | Performed by: PHYSICIAN ASSISTANT

## 2024-05-25 PROCEDURE — 99239 HOSP IP/OBS DSCHRG MGMT >30: CPT | Performed by: INTERNAL MEDICINE

## 2024-05-25 PROCEDURE — G0378 HOSPITAL OBSERVATION PER HR: HCPCS

## 2024-05-25 PROCEDURE — 84132 ASSAY OF SERUM POTASSIUM: CPT | Performed by: PHYSICIAN ASSISTANT

## 2024-05-25 PROCEDURE — 99207 PR NO BILLABLE SERVICE THIS VISIT: CPT | Performed by: INTERNAL MEDICINE

## 2024-05-25 PROCEDURE — 36415 COLL VENOUS BLD VENIPUNCTURE: CPT | Performed by: PHYSICIAN ASSISTANT

## 2024-05-25 PROCEDURE — 97161 PT EVAL LOW COMPLEX 20 MIN: CPT | Mod: GP

## 2024-05-25 PROCEDURE — 93306 TTE W/DOPPLER COMPLETE: CPT | Mod: 26 | Performed by: INTERNAL MEDICINE

## 2024-05-25 PROCEDURE — 250N000013 HC RX MED GY IP 250 OP 250 PS 637: Performed by: PHYSICIAN ASSISTANT

## 2024-05-25 PROCEDURE — 93306 TTE W/DOPPLER COMPLETE: CPT

## 2024-05-25 PROCEDURE — 36415 COLL VENOUS BLD VENIPUNCTURE: CPT | Performed by: INTERNAL MEDICINE

## 2024-05-25 PROCEDURE — 82272 OCCULT BLD FECES 1-3 TESTS: CPT | Performed by: INTERNAL MEDICINE

## 2024-05-25 PROCEDURE — 97530 THERAPEUTIC ACTIVITIES: CPT | Mod: GP

## 2024-05-25 PROCEDURE — 250N000013 HC RX MED GY IP 250 OP 250 PS 637: Performed by: INTERNAL MEDICINE

## 2024-05-25 RX ORDER — MAGNESIUM OXIDE 400 MG/1
800 TABLET ORAL 2 TIMES DAILY WITH MEALS
Status: DISCONTINUED | OUTPATIENT
Start: 2024-05-25 | End: 2024-05-25 | Stop reason: HOSPADM

## 2024-05-25 RX ORDER — LOSARTAN POTASSIUM 50 MG/1
50 TABLET ORAL AT BEDTIME
Status: DISCONTINUED | OUTPATIENT
Start: 2024-05-25 | End: 2024-05-25 | Stop reason: HOSPADM

## 2024-05-25 RX ORDER — ALLOPURINOL 100 MG/1
200 TABLET ORAL DAILY
Status: DISCONTINUED | OUTPATIENT
Start: 2024-05-25 | End: 2024-05-25 | Stop reason: HOSPADM

## 2024-05-25 RX ORDER — LEVOTHYROXINE SODIUM 25 UG/1
25 TABLET ORAL DAILY
Status: DISCONTINUED | OUTPATIENT
Start: 2024-05-26 | End: 2024-05-25 | Stop reason: HOSPADM

## 2024-05-25 RX ORDER — DONEPEZIL HYDROCHLORIDE 10 MG/1
10 TABLET, FILM COATED ORAL DAILY
Status: DISCONTINUED | OUTPATIENT
Start: 2024-05-25 | End: 2024-05-25 | Stop reason: HOSPADM

## 2024-05-25 RX ORDER — PANTOPRAZOLE SODIUM 40 MG/1
40 TABLET, DELAYED RELEASE ORAL DAILY
Status: DISCONTINUED | OUTPATIENT
Start: 2024-05-25 | End: 2024-05-25 | Stop reason: HOSPADM

## 2024-05-25 RX ORDER — ISOSORBIDE MONONITRATE 30 MG/1
30 TABLET, EXTENDED RELEASE ORAL DAILY
Status: DISCONTINUED | OUTPATIENT
Start: 2024-05-25 | End: 2024-05-25 | Stop reason: HOSPADM

## 2024-05-25 RX ADMIN — APIXABAN 2.5 MG: 2.5 TABLET, FILM COATED ORAL at 09:06

## 2024-05-25 RX ADMIN — PANTOPRAZOLE SODIUM 40 MG: 40 TABLET, DELAYED RELEASE ORAL at 13:42

## 2024-05-25 RX ADMIN — ACETAMINOPHEN 650 MG: 325 TABLET, FILM COATED ORAL at 09:06

## 2024-05-25 RX ADMIN — DONEPEZIL HYDROCHLORIDE 10 MG: 10 TABLET ORAL at 13:41

## 2024-05-25 RX ADMIN — ISOSORBIDE MONONITRATE 30 MG: 30 TABLET, EXTENDED RELEASE ORAL at 13:42

## 2024-05-25 RX ADMIN — SODIUM ZIRCONIUM CYCLOSILICATE 10 G: 5 POWDER, FOR SUSPENSION ORAL at 09:07

## 2024-05-25 RX ADMIN — METOPROLOL SUCCINATE 50 MG: 50 TABLET, EXTENDED RELEASE ORAL at 09:05

## 2024-05-25 RX ADMIN — NIFEDIPINE 30 MG: 30 TABLET, FILM COATED, EXTENDED RELEASE ORAL at 09:07

## 2024-05-25 RX ADMIN — Medication 800 MG: at 17:15

## 2024-05-25 RX ADMIN — ALLOPURINOL 200 MG: 100 TABLET ORAL at 13:41

## 2024-05-25 ASSESSMENT — ACTIVITIES OF DAILY LIVING (ADL)
ADLS_ACUITY_SCORE: 39
ADLS_ACUITY_SCORE: 41
ADLS_ACUITY_SCORE: 39
ADLS_ACUITY_SCORE: 39
ADLS_ACUITY_SCORE: 41
ADLS_ACUITY_SCORE: 39
ADLS_ACUITY_SCORE: 41
ADLS_ACUITY_SCORE: 39
ADLS_ACUITY_SCORE: 41
ADLS_ACUITY_SCORE: 39
ADLS_ACUITY_SCORE: 41
ADLS_ACUITY_SCORE: 39
ADLS_ACUITY_SCORE: 41
ADLS_ACUITY_SCORE: 39

## 2024-05-25 NOTE — DISCHARGE SUMMARY
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 Resolved   Paroxysmal atrial fibrillation   Sinus bradycardia     Stable chronic medical issues - PTA medications as appropriate   Cognitive impairment  HLD  Hx gout  Hypothyroidism    Clinically Significant Risk Factors          Follow-ups Needed After Discharge   Follow-up Appointments     Follow-up and recommended labs and tests       Follow up with primary care provider, Mandeep Vigil, within 7 days   for hospital follow- up.  The following labs/tests are recommended: BMP   and CBC.  Also continue to address blood pressures if still elevated          Unresulted Labs Ordered in the Past 30 Days of this Admission       No orders found for last 31 day(s).          Discharge Disposition   Discharged to home  Condition at discharge: Stable    Hospital Course   Jack Ryan is a 88 year old male with a past medical history significant for CKD in the setting of ANCA vasculitis, HTN, BPH, paroxysmal atrial fibrillation, HLD, hypothyroidism, cognitive impairment, gout admitted on 5/24/2024 to observation after presenting with dizziness.      Light headedness, likely multifactorial in the setting of accelerated hypertension and hypovolemia   Systolic murmur   Patient presented from adult  due to acute onset dizziness while playing dominos. Describes as feeling light headed and off balance with subsequent episode of vomiting. BP at the time was 200 systolic per report.  Denies associated cardiac symptoms   * MRI/MRA in the ED shows chronic small vessel ischemic disease with unremarkable angio  *EKG with sinus bradycardia in 50s, incomplete RBBB (known)  * s/p IVF bolus and meclizine in the ED   * Echo showed normal EF, mild to moderate TR, mild AR, mild MR  - Monitory on telemetry  - Orthostats  - Received fluids, will stop      Accelerated hypertension  BP has been generally around 140-150 at home per daughter.   BP taken by nursing at adult  reportedly in 200s.  BP on arrival to ED elevated 180-190s  PTA he follows with Nephrology, awaiting med rec but daughter reports he takes metoprolol, Imdur, and losartan. He took his normal medication morning of admission   - Resumed PTA Imdur and Losarted  - PTA Metoprolol  - Nifedipine started, given reports of normal BP at home was not continued at discharge  - Hydralazine PRN   - Recommended close follow up with PCP      Acute on chronic anemia  Hgb 11 on admission and down to 9.3 next day.  No reports of bleeding or bloody stools.  May be a dilutional component   - Recheck improved to 9.5.  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 Resolved   Patient follows with Nephrology through [facility].  Cr usually in the high 2s.  Cr on admission 3.2, improved to 2.87 after IVF   K on admission mildly elevated 5.4>5.8 after IVF  Daughter reports water intake is minimal at home despite encouragement  - Received Sodium Bicarb   - Lokelma x2  - Renal diet   - Outpatient follow up      Paroxysmal atrial fibrillation   Sinus bradycardia   PTA on metoprolol and Eliquis per daughter.  In sinus rhythm on admission.  Per most recent Cardiology notes baseline HR 40-50s and he has been asymptomatic with this so BB continued at present dose.   - Monitor on telemetry  - PTA Metoprolol  - Eliquis briefly held.  Okay to resume with stabilization in hemoglobin and no  obvious bleeding seen         Stable chronic medical issues - PTA medications as appropriate   Cognitive impairment  HLD  Hx gout  Hypothyroidism    Consultations This Hospital Stay   PHYSICAL THERAPY ADULT IP CONSULT  CARE MANAGEMENT / SOCIAL WORK IP CONSULT    Code Status   Full Code    Time Spent on this Encounter   I, Bassam Escobar DO, personally saw the patient today and spent greater than 30 minutes discharging this patient.       Bassam Escobar DO  [facility] EXTENDED RECOVERY AND SHORT STAY  [address] [address] [address]  Phone: [phone]  ______________________________________________________________________    Physical Exam   Vital Signs: Temp: 98.6  F (37  C) Temp src: Oral BP: (!) 146/71 Pulse: 55   Resp: 18 SpO2: 95 % O2 Device: None (Room air)    Weight: 138 lbs 7.18 oz  General Appearance:  Resting comfortably. NAD  Respiratory: No respiratory distress  Cardiovascular: RRR.  3/6 ?systolic? murmur  GI: Soft.  Non-distended  Skin: No obvious rashes or cyanosis  Other:  Alert.  No edema            Primary Care Physician   Mandeep Vigil    Discharge Orders      Reason for your hospital stay    Light headedness/dizziness, suspect due to dehydration     Follow-up and recommended labs and tests     Follow up with primary care provider, Mandeep Vigil, within 7 days for hospital follow- up.  The following labs/tests are recommended: BMP and CBC.  Also continue to address blood pressures if still elevated     Activity    Your activity upon discharge: activity as tolerated     Diet    Follow this diet upon discharge: Orders Placed This Encounter      Renal Diet (non-dialysis)       Significant Results and Procedures   Most Recent 3 CBC's:  Recent Labs   Lab Test 05/25/24  1645 05/25/24  0810 05/24/24  1232   WBC  --  7.0 8.2   HGB 9.5* 9.3* 11.4*   MCV  --  100 103*   PLT  --  174 197     Most Recent 3 BMP's:  Recent Labs   Lab Test 05/25/24  0810  05/25/24  0010 05/24/24  1821 05/24/24  1232     --  140 141   POTASSIUM 4.7 4.3 5.8* 5.4*   CHLORIDE 107  --  109* 109*   CO2 24  --  21* 21*   BUN 45.8*  --  54.6* 59.4*   CR 2.79*  --  2.87* 3.20*   ANIONGAP 9  --  10 11   JORDY 8.6*  --  8.9 9.4   *  --  140* 115*   ,   Results for orders placed or performed during the hospital encounter of 05/24/24   MRA Angiogram Head w/o Contrast    Narrative    MR ANGIOGRAM OF THE HEAD WITHOUT CONTRAST   5/24/2024 3:30 PM     COMPARISON: None    HISTORY: Dizziness, vertigo, headache.    TECHNIQUE:  3D time-of-flight MR angiogram of the head without  contrast. MIP reconstruction of all MR angiographic data was  performed.    FINDINGS:  The bilateral distal internal carotid, basilar, bilateral  anterior cerebral, bilateral middle cerebral and bilateral posterior  cerebral arteries are patent and unremarkable with no evidence for  cerebral artery stenosis or aneurysm.        Impression    IMPRESSION:  Normal MR angiogram of the head.      KAYLAN BARRON MD         SYSTEM ID:  [id number]   MRA Angiogram Neck w/o Contrast    Narrative    MRA NECK WITHOUT CONTRAST  5/24/2024 3:30 PM     COMPARISON: None    HISTORY: Dizziness, vertigo, headache.    TECHNIQUE: 2D time-of-flight MR angiogram of the neck without  contrast. MIP reconstruction of all MR angiographic data was  performed. Estimates of carotid stenoses are made relative to the  distal internal carotid artery diameters except as noted.    FINDINGS:    Carotids: The common carotid arteries bilaterally are widely patent.  The cervical internal carotid arteries bilaterally are patent without  stenosis.    Vertebrals: The vertebral arteries bilaterally are patent without  stenosis and demonstrate antegrade flow.    Aortic arch: The arteries as they arise from the aortic arch are  normally arranged with no evidence for stenosis.      Impression    IMPRESSION: Normal noncontrast MR angiogram of the neck.    KAYLAN  MD BEATRICE         SYSTEM ID:  [id number]   MR Brain w/o Contrast    Narrative    MRI OF THE BRAIN WITHOUT CONTRAST 2024 3:30 PM     COMPARISON: None.    HISTORY: Dizziness, vertigo, headache.     TECHNIQUE:   Brain: Axial diffusion-weighted with ADC map, T2-weighted with fat  saturation, T1-weighted and turboFLAIR and sagittal T1-weighted images  of the brain were obtained without intravenous contrast.     FINDINGS: There is mild diffuse cerebral volume loss. There are  minimal patchy periventricular and a few tiny scattered focal areas of  abnormal T2 signal hyperintensity in the cerebral white matter  bilaterally that are consistent with sequela of chronic small vessel  ischemic disease.    The ventricles and basal cisterns are within normal limits in  configuration given the degree of cerebral volume loss. There is no  midline shift. There are no extra-axial fluid collections. There is no  evidence for stroke or acute intracranial hemorrhage.    There is no sinusitis or mastoiditis.      Impression    IMPRESSION: Diffuse cerebral volume loss and cerebral white matter  changes consistent with chronic small vessel ischemic disease. No  evidence for acute intracranial pathology.    KAYLAN BARRON MD         SYSTEM ID:  [id number]   Echocardiogram Complete     Value    LVEF  60-65%    Narrative    [id number]  [id number]  [id number]  [id number]^BOBBI^SKYLAR^ROSE     [facility]  Echocardiography Laboratory  [address] [place] [address]     Name: CAROL REYES  MRN: [id number]  : 1935  Study Date: 2024 12:31 PM  Age: 88 yrs  Gender: Male  Patient Location: [facility]  Reason For Study: Dizziness  Ordering Physician: SKYLAR MARTINES  Performed By: Mala Zepeda     BSA: 1.6 m2  Height: 62 in  Weight: 138 lb  HR: 52  BP: 149/64 mmHg  ______________________________________________________________________________  Procedure  Complete Echo  Adult.  ______________________________________________________________________________  Interpretation Summary     The visual ejection fraction is 60-65%. There is mild concentric left  ventricular hypertrophy.  The right ventricle is normal in size and function.  There is mild to moderate (1-2+) tricuspid regurgitation. There is mild (1+)  aortic regurgitation. There is mild (1+) mitral regurgitation.  The inferior vena cava was normal in size with preserved respiratory  variability.  ______________________________________________________________________________  Left Ventricle  The left ventricle is normal in size. There is mild concentric left  ventricular hypertrophy. Left ventricular systolic function is normal. The  visual ejection fraction is 60-65%. Diastolic Doppler findings (E/E' ratio  and/or other parameters) suggest left ventricular filling pressures are  normal.     Right Ventricle  The right ventricle is normal in size and function.     Atria  Normal left atrial size. Right atrial size is normal.     Mitral Valve  The mitral valve is normal in structure and function. There is mild (1+)  mitral regurgitation.     Tricuspid Valve  The right ventricular systolic pressure is approximated at 34mmHg plus the  right atrial pressure. There is mild to moderate (1-2+) tricuspid  regurgitation.     Aortic Valve  The aortic valve is trileaflet with aortic valve sclerosis. There is mild (1+)  aortic regurgitation. The mean AoV pressure gradient is 5.0 mmHg. The peak AoV  pressure gradient is 9.0 mmHg. No hemodynamically significant valvular aortic  stenosis. The calculated aortic valve are is 2.2 cm^2.     Pulmonic Valve  The pulmonic valve is not well visualized.     Vessels  The aortic root is normal size. The inferior vena cava was normal in size with  preserved respiratory variability.     Pericardium  There is no pericardial effusion.     Rhythm  Sinus rhythm was  noted.  ______________________________________________________________________________  MMode/2D Measurements & Calculations     IVSd: 1.1 cm  LVIDd: 4.7 cm  LVIDs: 2.5 cm  LVPWd: 1.0 cm  FS: 46.6 %  LV mass(C)d: 175.8 grams  LV mass(C)dI: 107.7 grams/m2  Ao root diam: 3.4 cm  asc Aorta Diam: 3.6 cm  LVOT diam: 1.9 cm  LVOT area: 2.9 cm2  Ao root diam index Ht(cm/m): 2.2  Ao root diam index BSA (cm/m2): 2.1  Asc Ao diam index BSA (cm/m2): 2.2  Asc Ao diam index Ht(cm/m): 2.3  LA Volume (BP): 36.9 ml     LA Volume Index (BP): 22.6 ml/m2  RV Base: 3.1 cm  RWT: 0.43  TAPSE: 2.6 cm     Doppler Measurements & Calculations  MV E max dion: 57.6 cm/sec  MV A max dion: 90.1 cm/sec  MV E/A: 0.64  MV dec time: 0.24 sec  Ao V2 max: 154.0 cm/sec  Ao max P.0 mmHg  Ao V2 mean: 103.0 cm/sec  Ao mean P.0 mmHg  Ao V2 VTI: 37.1 cm  TINY(I,D): 2.2 cm2  TINY(V,D): 2.2 cm2  AI P1/2t: 538.9 msec  LV V1 max P.3 mmHg  LV V1 max: 115.0 cm/sec  LV V1 VTI: 28.9 cm  SV(LVOT): 83.4 ml  SI(LVOT): 51.1 ml/m2  PA V2 max: 120.3 cm/sec  PA max P.8 mmHg  PA acc time: 0.16 sec  AV Dion Ratio (DI): 0.75     TINY Index (cm2/m2): 1.4  E/E' av.3  Lateral E/e': 5.5  Medial E/e': 9.1  RV S Dion: 14.4 cm/sec     ______________________________________________________________________________  Report approved by: Josh Morales 2024 02:43 PM             Discharge Medications   Current Discharge Medication List        CONTINUE these medications which have NOT CHANGED    Details   allopurinol (ZYLOPRIM) 100 MG tablet Take 200 mg by mouth daily      apixaban ANTICOAGULANT (ELIQUIS) 2.5 MG tablet Take 2.5 mg by mouth 2 times daily      atorvastatin (LIPITOR) 20 MG tablet Take 20 mg by mouth daily      bimatoprost (LUMIGAN) 0.01 % SOLN Place 1 drop into both eyes at bedtime      cyanocobalamin (VITAMIN B-12) 1000 MCG tablet Take 500 mcg by mouth daily      donepezil (ARICEPT) 10 MG tablet Take 10 mg by mouth daily      isosorbide mononitrate  (IMDUR) 30 MG 24 hr tablet Take 30 mg by mouth daily      levothyroxine (SYNTHROID/LEVOTHROID) 25 MCG tablet Take 25 mcg by mouth daily      losartan (COZAAR) 50 MG tablet Take 50 mg by mouth at bedtime      magnesium oxide (MAG-OX) 400 MG tablet Take 800 mg by mouth 2 times daily      metoprolol succinate ER (TOPROL XL) 50 MG 24 hr tablet Take 50 mg by mouth 2 times daily      omeprazole (PRILOSEC) 20 MG DR capsule Take 20 mg by mouth daily      sodium chloride (OCEAN) 0.65 % nasal spray Spray 1 spray into both nostrils daily as needed for congestion      Vitamin D3 (CHOLECALCIFEROL) 25 mcg (1000 units) tablet Take 25 mcg by mouth daily           Allergies   Allergies   Allergen Reactions    Procaine Unknown

## 2024-05-25 NOTE — PHARMACY-ADMISSION MEDICATION HISTORY
Pharmacist Admission Medication History    Admission medication history is complete. The information provided in this note is only as accurate as the sources available at the time of the update.    Information Source(s): Family member and CareEverywhere/SureScripts via phone with pt's daughter     Pertinent Information: - pt's daughter states he takes Imdur 30 mg daily, last filled in Sept 2023 for bid dosing per surescript.     Changes made to PTA medication list:  Added: all  Deleted: acetazolamide, amlodipine 2.5 mg daily, asa 81 mg, docusate, flonase, ibuprofen, multivitamin  Changed: None    Allergies reviewed with patient and updates made in EHR: no    Medication History Completed By: Akil Aliheyder, [facility] 5/24/2024 11:37 PM    PTA Med List   Medication Sig Last Dose    allopurinol (ZYLOPRIM) 100 MG tablet Take 200 mg by mouth daily 5/24/2024    apixaban ANTICOAGULANT (ELIQUIS) 2.5 MG tablet Take 2.5 mg by mouth 2 times daily 5/24/2024 at am    atorvastatin (LIPITOR) 20 MG tablet Take 20 mg by mouth daily 5/24/2024    bimatoprost (LUMIGAN) 0.01 % SOLN Place 1 drop into both eyes at bedtime 5/23/2024    cyanocobalamin (VITAMIN B-12) 1000 MCG tablet Take 500 mcg by mouth daily 5/24/2024    donepezil (ARICEPT) 10 MG tablet Take 10 mg by mouth daily 5/24/2024    isosorbide mononitrate (IMDUR) 30 MG 24 hr tablet Take 30 mg by mouth daily 5/24/2024    levothyroxine (SYNTHROID/LEVOTHROID) 25 MCG tablet Take 25 mcg by mouth daily 5/24/2024    losartan (COZAAR) 50 MG tablet Take 50 mg by mouth at bedtime 5/23/2024    magnesium oxide (MAG-OX) 400 MG tablet Take 800 mg by mouth 2 times daily 5/24/2024 at am    metoprolol succinate ER (TOPROL XL) 50 MG 24 hr tablet Take 50 mg by mouth 2 times daily 5/24/2024 at am    omeprazole (PRILOSEC) 20 MG DR capsule Take 20 mg by mouth daily 5/24/2024    sodium chloride (OCEAN) 0.65 % nasal spray Spray 1 spray into both nostrils daily as needed for congestion prn    Vitamin D3  (CHOLECALCIFEROL) 25 mcg (1000 units) tablet Take 25 mcg by mouth daily 5/24/2024

## 2024-05-25 NOTE — PROGRESS NOTES
Observation goals  PRIOR TO DISCHARGE       Comments:   Dizziness improved: MET  BP improved: NOT MET, BP still slightly high  K improved: MET, K+ 4.3  Nurse to notify provider when observation goals have been met and patient is ready for discharge.

## 2024-05-25 NOTE — PROGRESS NOTES
Observation goals  PRIOR TO DISCHARGE       Comments:   Dizziness improved: Endorsed slight dizziness with PT earlier, BP high at the time  BP improved: Not met, BP very high early, recheck improved after morning meds  K improved: MET, K+ 4.7  Nurse to notify provider when observation goals have been met and patient is ready for discharge.

## 2024-05-25 NOTE — PROGRESS NOTES
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      Light headedness, likely multifactorial in the setting of accelerated hypertension and hypovolemia   Systolic murmur   Patient presented from adult  due to acute onset dizziness while playing dominos. Describes as feeling light headed and off balance with subsequent episode of vomiting. BP at the time was 200 systolic per report. Denies associated cardiac symptoms   * MRI/MRA in the ED shows chronic small vessel ischemic disease with unremarkable angio  *EKG with sinus bradycardia in 50s, incomplete RBBB (known)  * s/p IVF bolus and meclizine in the ED   - Monitory on telemetry  - Orthostats  - Received fluids, will stop   - Echo ordered      Accelerated hypertension  BP has been generally around 140-150 at home per daughter.   BP taken by nursing at adult  reportedly in 200s.  BP on arrival to ED elevated 180-190s  PTA he follows with Nephrology, awaiting med rec but daughter reports he takes metoprolol, Imdur, and losartan. He took his normal medication morning of admission   - Resumed PTA Imdur and Losarted  - PTA Metoprolol  - Nifedipine started, continue for now   - Hydralazine PRN     Acute on chronic anemia  Hgb 11 on admission and down to 9 next day.  No reports of bleeding or bloody stools.  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 Resolved   Patient follows with Nephrology through [facility].  Cr usually in the high 2s.  Cr on admission 3.2, improved  to 2.87 after IVF   K on admission mildly elevated 5.4>5.8 after IVF  Daughter reports water intake is minimal at home despite encouragement  - Received Sodium Bicarb   - Lokelma x2  - Renal diet      Paroxysmal atrial fibrillation   Sinus bradycardia   PTA on metoprolol and Eliquis per daughter.  In sinus rhythm on admission.  Per most recent Cardiology notes baseline HR 40-50s and he has been asymptomatic with this so BB continued at present dose.   - Monitor on telemetry  - PTA Metoprolol  - Holding Eliquis due to drop in hemoglobin.  Resume if no evidence of bleeding       Stable chronic medical issues - PTA medications as appropriate   Cognitive impairment  HLD  Hx gout  Hypothyroidism       Observation Goals: Dizziness improved, BP improved , K improved , Nurse to notify provider when observation goals have been met and patient is ready for discharge.  Diet: Renal Diet (non-dialysis)    DVT Prophylaxis: Pneumatic Compression Devices  [place] Catheter: Not present  Lines: None     Cardiac Monitoring: ACTIVE order. Indication: dizziness  Code Status: Full Code      Clinically Significant Risk Factors Present on Admission        # Hyperkalemia: Highest K = 5.8 mmol/L in last 2 days, will monitor as appropriate        # Drug Induced Coagulation Defect: home medication list includes an anticoagulant medication    # Hypertension: Home medication list includes antihypertensive(s)                 Disposition Plan     Medically Ready for Discharge: Anticipated Tomorrow             Bassam Escobar DO  Hospitalist Service  [facility]  Securely message with [employer] (more info)  Text page via [employer] Paging/Directory   ______________________________________________________________________    Interval History   Patient seen and examined.  Difficult to obtain history due to patient's baseline cognition and language barrier.  No acute events over night.  No fevers or hypoxia. No pain currently.  Denied  dizziness/lightheadedness    Physical Exam   Vital Signs: Temp: 98.5  F (36.9  C) Temp src: Oral BP: (!) 150/74 (TN RN notify) Pulse: 55   Resp: 18 SpO2: 97 % O2 Device: None (Room air)    Weight: 138 lbs 7.18 oz    General Appearance: Resting comfortably. NAD  Respiratory: No respiratory distress  Cardiovascular: RRR.  3/6 systolic murmur  GI: Soft.  Non-distended  Skin: No obvious rashes or cyanosis  Other: Alert.  No edema      Medical Decision Making       55 MINUTES SPENT BY ME on the date of service doing chart review, history, exam, documentation & further activities per the note.      Data   ------------------------- PAST 24 HR DATA REVIEWED -----------------------------------------------    I have personally reviewed the following data over the past 24 hrs:    7.0  \   9.3 (L)   / 174     140 107 45.8 (H) /  104 (H)   4.7 24 2.79 (H) \     ALT: N/A AST: N/A AP: N/A TBILI: N/A   ALB: N/A TOT PROTEIN: N/A LIPASE: N/A       Imaging results reviewed over the past 24 hrs:   Recent Results (from the past 24 hour(s))   MRA Angiogram Head w/o Contrast    Narrative    MR ANGIOGRAM OF THE HEAD WITHOUT CONTRAST   5/24/2024 3:30 PM     COMPARISON: None    HISTORY: Dizziness, vertigo, headache.    TECHNIQUE:  3D time-of-flight MR angiogram of the head without  contrast. MIP reconstruction of all MR angiographic data was  performed.    FINDINGS:  The bilateral distal internal carotid, basilar, bilateral  anterior cerebral, bilateral middle cerebral and bilateral posterior  cerebral arteries are patent and unremarkable with no evidence for  cerebral artery stenosis or aneurysm.        Impression    IMPRESSION:  Normal MR angiogram of the head.      KAYLAN BARRON MD         SYSTEM ID:  [id number]   MRA Angiogram Neck w/o Contrast    Narrative    MRA NECK WITHOUT CONTRAST  5/24/2024 3:30 PM     COMPARISON: None    HISTORY: Dizziness, vertigo, headache.    TECHNIQUE: 2D time-of-flight MR angiogram of the neck  without  contrast. MIP reconstruction of all MR angiographic data was  performed. Estimates of carotid stenoses are made relative to the  distal internal carotid artery diameters except as noted.    FINDINGS:    Carotids: The common carotid arteries bilaterally are widely patent.  The cervical internal carotid arteries bilaterally are patent without  stenosis.    Vertebrals: The vertebral arteries bilaterally are patent without  stenosis and demonstrate antegrade flow.    Aortic arch: The arteries as they arise from the aortic arch are  normally arranged with no evidence for stenosis.      Impression    IMPRESSION: Normal noncontrast MR angiogram of the neck.    KAYLAN BARRON MD         SYSTEM ID:  [id number]   MR Brain w/o Contrast    Narrative    MRI OF THE BRAIN WITHOUT CONTRAST 5/24/2024 3:30 PM     COMPARISON: None.    HISTORY: Dizziness, vertigo, headache.     TECHNIQUE:   Brain: Axial diffusion-weighted with ADC map, T2-weighted with fat  saturation, T1-weighted and turboFLAIR and sagittal T1-weighted images  of the brain were obtained without intravenous contrast.     FINDINGS: There is mild diffuse cerebral volume loss. There are  minimal patchy periventricular and a few tiny scattered focal areas of  abnormal T2 signal hyperintensity in the cerebral white matter  bilaterally that are consistent with sequela of chronic small vessel  ischemic disease.    The ventricles and basal cisterns are within normal limits in  configuration given the degree of cerebral volume loss. There is no  midline shift. There are no extra-axial fluid collections. There is no  evidence for stroke or acute intracranial hemorrhage.    There is no sinusitis or mastoiditis.      Impression    IMPRESSION: Diffuse cerebral volume loss and cerebral white matter  changes consistent with chronic small vessel ischemic disease. No  evidence for acute intracranial pathology.    KAYLAN BARRON MD         SYSTEM ID:  [id number]

## 2024-05-25 NOTE — PROGRESS NOTES
05/25/24 0818   Appointment Info   Signing Clinician's Name / Credentials (PT) Shu Grover, PT, DPT   Quick Adds   Quick Adds Certification;Vestibular Eval       Present yes   Language Iraqi  (amcure  services)   Living Environment   People in Home facility resident   Current Living Arrangements assisted living   Home Accessibility no concerns   Transportation Anticipated health plan transportation   Self-Care   Usual Activity Tolerance good   Current Activity Tolerance moderate   Regular Exercise Yes   Activity/Exercise Type walking   Exercise Amount/Frequency 3-5 times/wk   Equipment Currently Used at Home walker, rolling   Fall history within last six months no   Activity/Exercise/Self-Care Comment Patient reports that he gets assist with ADLs in AM and PM at his [facility]. He goes to an adult  4 days a week, he likes to play dominos and go for walks there. His family preps many meals for him, but he also goes to the dining room for some meals. He mostly walks without an AD, but has a walker he sometimes uses for longer distances.   General Information   Onset of Illness/Injury or Date of Surgery 05/24/24   Referring Physician Fay Mccloud PA-C   Patient/Family Therapy Goals Statement (PT) go home   Pertinent History of Current Problem (include personal factors and/or comorbidities that impact the POC) Patient is 87 YO M who presented to hospital from his adult  with dizziness. He was admitted under observation with accelerate hypertension, ALYSSIA. 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   Existing Precautions/Restrictions fall   General Observations Patient in bed, agreeable to PT. Reports he has not been up yet today, does not know if he is dizzy yet   Cognition   Affect/Mental Status (Cognition) WFL   Orientation Status (Cognition) oriented x 3   Follows  Commands (Cognition) follows one-step commands   Cognitive Status Comments Patient able to give thorough subjective history, including his daily schedule.   Pain Assessment   Patient Currently in Pain No   Integumentary/Edema   Integumentary/Edema no deficits were identifed   Posture    Posture Protracted shoulders   Range of Motion (ROM)   Range of Motion ROM is WNL   Strength (Manual Muscle Testing)   Strength (Manual Muscle Testing) strength is WNL   Bed Mobility   Bed Mobility no deficits identified   Comment, (Bed Mobility) Independent supine <> sit   Transfers   Transfers sit-stand transfer   Sit-Stand Transfer   Sit-Stand [place] (Transfers) supervision   Comment, (Sit-Stand Transfer) From edge of bed without AD   Gait/Stairs (Locomotion)   [place] Level (Gait) supervision   Distance in Feet (Gait) 20'   Comment, (Gait/Stairs) Patient ambulated into hallway with SBA for safety, no losess of balance but guarded posture observed.   Balance   Balance Comments Independent sitting balance; No imbalances with ambulation including with head turns   Sensory Examination   Sensory Perception patient reports no sensory changes   Coordination   Coordination no deficits were identified   Muscle Tone   Muscle Tone no deficits were identified   Cervicogenic Screen   Neck ROM WNL and pain free   Oculomotor Exam   Ocular ROM Normal   Infrared Goggle Exam or Frenzel Lense Exam   Exam completed with Room Light   Spontaneous Nystagmus Negative   Positional testing Negative   Positional Testing Comments Deferred formal testing due to lightheadedness during session associated with hypertension, no positional symptoms   Clinical Impression   Criteria for Skilled Therapeutic Intervention Yes, treatment indicated   PT Diagnosis (PT) Impaired mobility   Influenced by the following impairments Dizziness, decreased activity tolerance   Functional limitations due to impairments Increased difficulty with ambulation   Clinical  Presentation (PT Evaluation Complexity) stable   Clinical Presentation Rationale Medical status, clinical judgement   Clinical Decision Making (Complexity) low complexity   Planned Therapy Interventions (PT) balance training;gait training;patient/family education;progressive activity/exercise   Risk & Benefits of therapy have been explained evaluation/treatment results reviewed;care plan/treatment goals reviewed;risks/benefits reviewed;current/potential barriers reviewed;participants voiced agreement with care plan;participants included;patient   PT Total Evaluation Time   PT Eval, Low Complexity Minutes (35630) 13   Therapy Certification   Start of care date 05/25/24   Certification date from 05/25/24   Certification date to 05/31/24   Medical Diagnosis Acute kidney injury superimposed on CKD   Physical Therapy Goals   PT Frequency 3x/week   PT Predicted Duration/Target Date for Goal Attainment 05/31/24   PT Goals Gait;PT Goal 1   PT: Gait Independent;Greater than 200 feet   PT: Goal 1 Patient will score > 19/24 on DGI or have safe AD plan in place to demo decreased risk for falls.   Interventions   Interventions Quick Adds Therapeutic Activity   Therapeutic Activity   Therapeutic Activities: dynamic activities to improve functional performance Minutes (18378) 24   Symptoms Noted During/After Treatment Significant change in vital signs;Dizziness   Treatment Detail/Skilled Intervention Patient ambulated in hallway with no AD and SBA. After ~ 70' patient reporting onset of lightheadedness. CGA provided for safety and cued patient to return to room, ~50'. No losses of balance. Attempted BP x3 before getting reading of 67/45. Cued patient to supine and JANN Vasquez called into room. Re-checked BP and noted to be  hypertensive. Orthostatic vital signs assessed, patient hypertensive throughout, see VS flowsheet. Mild lightheadedness in standing. SBA for transfer from EOB to chair. Education on possible need to use walker if  symptoms are not improved. Patient up in chair at end of session, alarm activated. RN in room.   PT Discharge Planning   PT Plan Gait without AD, DGI - likely only 1 more session   PT Discharge Recommendation (DC Rec) home with assist   PT Rationale for DC Rec Patient appears close to baseline, but currently limited by hypertension and associated dizziness/lightheadedness. Anticipate patient will be able to return to his [facility] where he gets AM/PM cares for ADLs at baseline when his symptoms are improved.   PT Brief overview of current status SBA without AD unless symptomatic then CGA for safety   Total Session Time   Timed Code Treatment Minutes 24   Total Session Time (sum of timed and untimed services) 37     [facility]  OUTPATIENT PHYSICAL THERAPY EVALUATION  PLAN OF TREATMENT FOR OUTPATIENT REHABILITATION  (COMPLETE FOR INITIAL CLAIMS ONLY)  Patient's Last Name, First Name, M.I.  YOB: 1935  FrancesmargaretJack  MURALI                        Provider's Name  [facility] Medical Record No.  [id number]                             Onset Date:  05/24/24   Start of Care Date:  05/25/24   Type:     _X_PT   ___OT   ___SLP Medical Diagnosis:  Acute kidney injury superimposed on CKD              PT Diagnosis:  Impaired mobility Visits from SOC:  1     See note for plan of treatment, functional goals and certification details    I CERTIFY THE NEED FOR THESE SERVICES FURNISHED UNDER        THIS PLAN OF TREATMENT AND WHILE UNDER MY CARE     (Physician co-signature of this document indicates review and certification of the therapy plan).

## 2024-05-25 NOTE — PROGRESS NOTES
Dizziness improved  Denies this shift  BP improved Met  K improved Not met, recheck at midnight, if elevated please notify on call provider.

## 2024-05-25 NOTE — PROGRESS NOTES
Observation goals  PRIOR TO DISCHARGE       Comments:   Dizziness improved:Met  BP improved:met  K improved: met, stat Hgb recheck pending  Nurse to notify provider when observation goals have been met and patient is ready for discharge.            SUMMARY NOTE: [id number]  Orientation/Cognitive: A&Ox4  Observation Goals (Met/ Not Met): met  Mobility Level/Assist Equipment: SBA  Antibiotics & Plan (IV/po, length of tx left): NA  Pain Management: tylenol for HA in the setting of high BP, improved  Tele/VS/O2: VSS on Ra,  BP elevated at times.   ABNL Lab/BG: Hgb 9.5  Diet: Renal  Bowel/Bladder: continent of B7B  Skin Concerns: None  Drains/Devices: Tele NSR  Patient Stated Goal for Today: Pt discharge home via family. AVS printed  instructions reviewed with Pt, Copy handed to Pt. Belongings returned to Pt.

## 2024-05-25 NOTE — PROGRESS NOTES
RECEIVING UNIT ED HANDOFF REVIEW    ED Nurse Handoff Report was reviewed by: Pedro Zuñiga RN on May 24, 2024 at 9:44 PM

## 2024-05-25 NOTE — PROGRESS NOTES
Pt arrived to the unit at shift change. Ugandan speaking, family helped with interpretation. AXOX3-4, VSS on RA, BP improved. K+ high. Lokelma started. Recheck at midnight, if still elevated please notify on call provider. Tele was SB-SR. IVF pending. Continent in the urinal. Renal diet. Up SBA, orthos negative. Denies dizziness this shift. Continue to monitor.

## 2024-05-25 NOTE — PROGRESS NOTES
Observation goals  PRIOR TO DISCHARGE       Comments:   Dizziness improved:Met  BP improved:met  K improved: met, stat Hgb recheck pending  Nurse to notify provider when observation goals have been met and patient is ready for discharge.        PRIMARY Concern: Dizziness/ HTN  SAFETY RISK Concerns (fall risk, behaviors, etc.): Fall risk   Isolation/Type: N/A  Tests/Procedures for NEXT shift: Echo done and lexie  Consults? (Pending/following, signed-off?)none  Where is patient from? (Home, TCU, etc.): Home  Other Important info for NEXT shift: Moldovan speaking  Anticipated DC date & active delays: possibly later today if Hgb recheck stable     SUMMARY NOTE:  Orientation/Cognitive: A&Ox4  Observation Goals (Met/ Not Met): Not met  Mobility Level/Assist Equipment: SBA  Antibiotics & Plan (IV/po, length of tx left): NA  Pain Management: tylenol for HA in the setting of high BP, improved  Tele/VS/O2: VSS on Ra,  BP elevated at times. Tele: SB-SR  ABNL Lab/BG: Hgb dropped to 9.3, Crt improved to 2.79  Diet: Renal  Bowel/Bladder: continent  Skin Concerns: None  Drains/Devices: PIV Sled + tele  Patient Stated Goal for Today: Wants to go home
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
